# Patient Record
(demographics unavailable — no encounter records)

---

## 2024-11-26 NOTE — DISCUSSION/SUMMARY
[Communication and Social Development] : communication and social development [Sleep Routines and Issues] : sleep routines and issues [Temper Tantrums and Discipline] : temper tantrums and discipline [Healthy Teeth] : healthy teeth [Safety] : safety [] : The components of the vaccine(s) to be administered today are listed in the plan of care. The disease(s) for which the vaccine(s) are intended to prevent and the risks have been discussed with the caretaker.  The risks are also included in the appropriate vaccination information statements which have been provided to the patient's caregiver.  The caregiver has given consent to vaccinate. [FreeTextEntry1] : DEEPIKA  is a 15 mo old boy here for 15 mo [facility]. Growing and developing well.  Discussed first dental visit, stopping bottles.  - continue ad hi feeds and diversifying diet - no bottle use, encourage sippy cup/regular cup - limit milk intake to 16-18oz every day to avoid cow's milk anemia - encourage safe sleep practice - reviewed car safety - limit screen time to 1hr per day - encourage verbal development with reading and singing - brush teeth 2x/d - vaccines given today: PCV and Dtap/IPV/HIb Mother declines flu vaccine. Return for 18 mo [facility]

## 2024-11-26 NOTE — PHYSICAL EXAM
[Alert] : alert [No Acute Distress] : no acute distress [Normocephalic] : normocephalic [Anterior [place] Closed] : anterior fontanelle closed [Red Reflex Bilateral] : red reflex bilateral [PERRL] : PERRL [Normally Placed Ears] : normally placed ears [Auricles Well Formed] : auricles well formed [Clear Tympanic membranes with present light reflex and bony landmarks] : clear tympanic membranes with present light reflex and bony landmarks [No Discharge] : no discharge [Nares Patent] : nares patent [Palate Intact] : palate intact [Uvula Midline] : uvula midline [Tooth Eruption] : tooth eruption  [Supple, full passive range of motion] : supple, full passive range of motion [No Palpable Masses] : no palpable masses [Symmetric Chest Rise] : symmetric chest rise [Clear to Auscultation Bilaterally] : clear to auscultation bilaterally [Regular Rate and Rhythm] : regular rate and rhythm [S1, S2 present] : S1, S2 present [Soft] : soft [NonTender] : non tender [Non Distended] : non distended [Normoactive Bowel Sounds] : normoactive bowel sounds [No Hepatomegaly] : no hepatomegaly [No Splenomegaly] : no splenomegaly [Uncircumcised] : uncircumcised [Central Urethral Opening] : central urethral opening [Testicles Descended Bilaterally] : testicles descended bilaterally [Patent] : patent [Normally Placed] : normally placed [No Abnormal Lymph Nodes Palpated] : no abnormal lymph nodes palpated [No Clavicular Crepitus] : no clavicular crepitus [Negative Tong-Ortalani] : negative Tong-Ortalani [Symmetric Buttocks Creases] : symmetric buttocks creases [No Spinal Dimple] : no spinal dimple [NoTuft of Hair] : no tuft of hair [Cranial Nerves Grossly Intact] : cranial nerves grossly intact [No Rash or Lesions] : no rash or lesions [FreeTextEntry8] : soft systolic murmur [de-identified] : hemagiomas fading

## 2024-11-26 NOTE — DEVELOPMENTAL MILESTONES
[Normal Development] : Normal Development [None] : none [Points to ask for something] : points to ask for something or to get help [Uses 3 words other than names] : uses 3 words other than names [Crawls up a few steps] : crawls up a few steps [Begins to run] : begins to run [Makes gustavo with crayon] : makes gustavo with jackieyon

## 2024-11-26 NOTE — HISTORY OF PRESENT ILLNESS
[Mother] : mother [Fruit] : fruit [Eggs] : eggs [Table food] : table food [Normal] : Normal [Brushing teeth] : Brushing teeth [Vitamin] : Primary Fluoride Source: Vitamin [No] : No cigarette smoke exposure [Car seat in back seat] : Car seat in back seat [Up to date] : Up to date [de-identified] : eating well

## 2025-02-26 NOTE — HISTORY OF PRESENT ILLNESS
[Mother] : mother [Fruit] : fruit [Vegetables] : vegetables [Meat] : meat [Normal] : Normal [Brushing teeth] : Brushing teeth [Yes] : Patient goes to dentist yearly [Vitamin] : Primary Fluoride Source: Vitamin [No] : No cigarette smoke exposure [Car seat in back seat] : Car seat in back seat [Influenza] : Influenza [de-identified] : nursing + whole milk [de-identified] : one day too early for Hep A

## 2025-02-26 NOTE — DEVELOPMENTAL MILESTONES
[Normal Development] : Normal Development [None] : none [Passed] : passed [Yes] : Completed. [FreeTextEntry1] : passed

## 2025-02-26 NOTE — DISCUSSION/SUMMARY
[Family Support] : family support [Child Development and Behavior] : child development and behavior [Language Promotion/Hearing] : language promotion/hearing [Toliet Training Readiness] : toliet training readiness [Safety] : safety [FreeTextEntry1] : DEEPIKA is a 18 mo old boy here for 18 mo [facility]. Growing and developing well. No parental concerns.  Small patch of impetigo on r earlobe crease, mupirocin sent. - continue ad hi feeds and diversifying diet - no bottle use, encourage sippy cup/regular cup - limit milk intake to 16-18oz every day to avoid cow's milk anemia - encourage safe sleep practice - reviewed car safety - limit screen time to 1hr per day - encourage verbal development with reading and singing - brush teeth 2x/d - one day too early for Hep A will give at 1 yo [facility]

## 2025-02-26 NOTE — PHYSICAL EXAM
[Alert] : alert [No Acute Distress] : no acute distress [Normocephalic] : normocephalic [Anterior [place] Closed] : anterior fontanelle closed [Red Reflex Bilateral] : red reflex bilateral [PERRL] : PERRL [Normally Placed Ears] : normally placed ears [Auricles Well Formed] : auricles well formed [Clear Tympanic membranes with present light reflex and bony landmarks] : clear tympanic membranes with present light reflex and bony landmarks [No Discharge] : no discharge [Nares Patent] : nares patent [Palate Intact] : palate intact [Uvula Midline] : uvula midline [Tooth Eruption] : tooth eruption  [Supple, full passive range of motion] : supple, full passive range of motion [No Palpable Masses] : no palpable masses [Symmetric Chest Rise] : symmetric chest rise [Clear to Auscultation Bilaterally] : clear to auscultation bilaterally [Regular Rate and Rhythm] : regular rate and rhythm [S1, S2 present] : S1, S2 present [No Murmurs] : no murmurs [+2 Femoral Pulses] : +2 femoral pulses [Soft] : soft [NonTender] : non tender [Non Distended] : non distended [Normoactive Bowel Sounds] : normoactive bowel sounds [No Hepatomegaly] : no hepatomegaly [No Splenomegaly] : no splenomegaly [Asaf 1] : Asaf 1 [Central Urethral Opening] : central urethral opening [Testicles Descended Bilaterally] : testicles descended bilaterally [Patent] : patent [Normally Placed] : normally placed [No Abnormal Lymph Nodes Palpated] : no abnormal lymph nodes palpated [No Clavicular Crepitus] : no clavicular crepitus [Symmetric Buttocks Creases] : symmetric buttocks creases [No Spinal Dimple] : no spinal dimple [NoTuft of Hair] : no tuft of hair [Cranial Nerves Grossly Intact] : cranial nerves grossly intact [No Rash or Lesions] : no rash or lesions [de-identified] : ALISA leigh